# Patient Record
Sex: MALE | Race: WHITE
[De-identification: names, ages, dates, MRNs, and addresses within clinical notes are randomized per-mention and may not be internally consistent; named-entity substitution may affect disease eponyms.]

---

## 2019-05-16 ENCOUNTER — APPOINTMENT (OUTPATIENT)
Dept: OTOLARYNGOLOGY | Facility: CLINIC | Age: 84
End: 2019-05-16
Payer: MEDICARE

## 2019-05-16 PROCEDURE — 92567 TYMPANOMETRY: CPT

## 2019-05-16 PROCEDURE — 92557 COMPREHENSIVE HEARING TEST: CPT

## 2019-05-16 PROCEDURE — 99213 OFFICE O/P EST LOW 20 MIN: CPT | Mod: 25

## 2019-05-16 PROCEDURE — 69210 REMOVE IMPACTED EAR WAX UNI: CPT

## 2019-05-16 RX ORDER — LOSARTAN POTASSIUM 50 MG/1
50 TABLET, FILM COATED ORAL
Qty: 30 | Refills: 0 | Status: ACTIVE | COMMUNITY
Start: 2018-11-21

## 2019-05-16 RX ORDER — SOLIFENACIN SUCCINATE 10 MG/1
10 TABLET, FILM COATED ORAL
Qty: 30 | Refills: 0 | Status: ACTIVE | COMMUNITY
Start: 2018-11-16

## 2019-05-16 RX ORDER — FLUTICASONE PROPIONATE 50 UG/1
50 SPRAY, METERED NASAL
Qty: 16 | Refills: 0 | Status: ACTIVE | COMMUNITY
Start: 2018-11-21

## 2019-05-16 RX ORDER — AMLODIPINE BESYLATE 10 MG/1
10 TABLET ORAL
Qty: 30 | Refills: 0 | Status: ACTIVE | COMMUNITY
Start: 2018-11-21

## 2019-05-16 RX ORDER — TERAZOSIN 10 MG/1
10 CAPSULE ORAL
Qty: 30 | Refills: 0 | Status: ACTIVE | COMMUNITY
Start: 2018-11-21

## 2019-05-16 NOTE — HISTORY OF PRESENT ILLNESS
[de-identified] : This gentleman presents for evaluation of change in hearing in his left ear in the last 3-5 months.\par He notices his hearing is not as acute positioned in his left ear.\par He has a previous history of tinnitus unfortunately that resolved and is not return. He is not having any vestibular symptoms.

## 2019-05-16 NOTE — ASSESSMENT
[FreeTextEntry1] : -Findings were reviewed and discussed with the patient in detail\par -Good aural hygiene reviewed\par -Patient may use wax removal drops as needed\par -The patient will be placed on antibiotic eardrops for one week\par -The patient will be placed on a short burst of prednisone.\par -He  may try a nasal steroid spray or decongestant/antihistamine.\par -Avoid noise exposure\par -Audiogram- He has a combination of age appropriate hearing loss as well as progression of hearing loss in his left ear. I compared this to a audiogram from one year ago. We did talk about the utility of working him up for retrocochlear lesion. He had familiar with acoustic neuroma. He does not of any vestibular symptoms are no onset of tinnitus so we will defer this at this time.\par He will be scheduled for an [facility] for hearing aid amplification consultation. He is medically cleared for amplification.\par -The patient was asked to call and return urgently if any problems\par -I will see the patient in follow up prn.

## 2019-05-16 NOTE — CONSULT LETTER
[Dear  ___] : Dear  [unfilled], [Please see my note below.] : Please see my note below. [Courtesy Letter:] : I had the pleasure of seeing your patient, [unfilled], in my office today. [Sincerely,] : Sincerely, [FreeTextEntry3] : Abdon Stevenson MD [FreeTextEntry1] : Enclosed audiogram.

## 2019-06-14 ENCOUNTER — APPOINTMENT (OUTPATIENT)
Dept: OTOLARYNGOLOGY | Facility: CLINIC | Age: 84
End: 2019-06-14

## 2021-07-20 ENCOUNTER — APPOINTMENT (OUTPATIENT)
Dept: OTOLARYNGOLOGY | Facility: CLINIC | Age: 86
End: 2021-07-20
Payer: MEDICARE

## 2021-07-20 VITALS — HEIGHT: 66 IN | TEMPERATURE: 97.3 F | BODY MASS INDEX: 22.34 KG/M2 | WEIGHT: 139 LBS

## 2021-07-20 PROCEDURE — 99214 OFFICE O/P EST MOD 30 MIN: CPT | Mod: 25

## 2021-07-20 PROCEDURE — 31231 NASAL ENDOSCOPY DX: CPT

## 2021-07-20 PROCEDURE — 92567 TYMPANOMETRY: CPT

## 2021-07-20 PROCEDURE — 69420 INCISION OF EARDRUM: CPT | Mod: LT

## 2021-07-20 PROCEDURE — 92557 COMPREHENSIVE HEARING TEST: CPT

## 2021-07-20 RX ORDER — KETOCONAZOLE 20.5 MG/ML
2 SHAMPOO, SUSPENSION TOPICAL
Qty: 120 | Refills: 0 | Status: DISCONTINUED | COMMUNITY
Start: 2018-09-27 | End: 2021-07-20

## 2021-07-26 NOTE — ASSESSMENT
[FreeTextEntry1] : Dizziness, fluid notable in the left middle ear and mastoid on neurology workup. He had left effusion, now drained by myringotomy. I asked him to observe water precautions and followup in 2 weeks. We will recheck his hearing at that point, to be sure the effusion has resolved and the tympanic membrane has reestablished itself.  It is unclear whether the left serous effusion is the source of his current dizziness,we will follow him to see if he improves after drainage

## 2021-07-26 NOTE — HISTORY OF PRESENT ILLNESS
[de-identified] : Patient previously seen by Dr. Stevenson May 2019 for cerumen impaction. 3 weeks ago reports that he fell in his building after being dizzy. Seen by Dr. Botello, referred to neurology. MRI for intracranial source of vertigo was negative but they did find "inflammation in the left ear."  Questions whether this is the source of vertigo. His left ear feels clogged and at the hearing is somewhat declined.

## 2021-08-03 ENCOUNTER — APPOINTMENT (OUTPATIENT)
Dept: OTOLARYNGOLOGY | Facility: CLINIC | Age: 86
End: 2021-08-03
Payer: MEDICARE

## 2021-08-03 VITALS — BODY MASS INDEX: 22.34 KG/M2 | TEMPERATURE: 96 F | HEIGHT: 66 IN | WEIGHT: 139 LBS

## 2021-08-03 PROCEDURE — 92557 COMPREHENSIVE HEARING TEST: CPT

## 2021-08-03 PROCEDURE — 92504 EAR MICROSCOPY EXAMINATION: CPT

## 2021-08-03 PROCEDURE — 99213 OFFICE O/P EST LOW 20 MIN: CPT | Mod: 25

## 2021-08-03 PROCEDURE — 92567 TYMPANOMETRY: CPT

## 2021-08-03 NOTE — HISTORY OF PRESENT ILLNESS
[de-identified] : Patient previously seen by Dr. Stevenson May 2019 for cerumen impaction.\par In early July reports that he fell in his building after being dizzy. Seen by Dr. Botello, referred to neurology. MRI for intracranial source of vertigo was negative but they did find "inflammation in the left ear."  MRI report showed serous effusion left middle ear and mastoid, less fluid right side.  Questions whether this is the source of vertigo. His left ear feels clogged and at the hearing is somewhat declined.  \par Audio showed conductive Overlay on sensorineural hearing loss. Myringotomy released a tremendous amount of fluid and the patient had instantaneous improvement. Still feels as if the ear is better, but baseline Imbalance and lightheadedness No spinning sensation, No positional symptoms

## 2021-08-03 NOTE — ASSESSMENT
[FreeTextEntry1] : Imbalance, persistent after resolution of effusion.  This is likely not the source of his symptoms. Patient also discontinued a glaucoma eyedrop that had side effect profile of dizziness, and likewise did not improve.I recommended VNG in further workup and possibly vestibular therapy thereafter.  we discussed dizziness and imbalance has a extremely long differential diagnosis list\par \par Persistent TM perf, followup 1-2 months. Recommended dry ear precautions in the meantime on the left side

## 2021-08-10 ENCOUNTER — APPOINTMENT (OUTPATIENT)
Dept: OTOLARYNGOLOGY | Facility: CLINIC | Age: 86
End: 2021-08-10
Payer: MEDICARE

## 2021-08-10 VITALS
HEIGHT: 66 IN | HEART RATE: 60 BPM | OXYGEN SATURATION: 98 % | DIASTOLIC BLOOD PRESSURE: 69 MMHG | BODY MASS INDEX: 22.34 KG/M2 | TEMPERATURE: 98.4 F | SYSTOLIC BLOOD PRESSURE: 169 MMHG | WEIGHT: 139 LBS

## 2021-08-10 VITALS — SYSTOLIC BLOOD PRESSURE: 176 MMHG | DIASTOLIC BLOOD PRESSURE: 73 MMHG

## 2021-08-10 DIAGNOSIS — Z78.9 OTHER SPECIFIED HEALTH STATUS: ICD-10-CM

## 2021-08-10 DIAGNOSIS — R42 DIZZINESS AND GIDDINESS: ICD-10-CM

## 2021-08-10 DIAGNOSIS — Z72.89 OTHER PROBLEMS RELATED TO LIFESTYLE: ICD-10-CM

## 2021-08-10 DIAGNOSIS — H40.059 OCULAR HYPERTENSION, UNSPECIFIED EYE: ICD-10-CM

## 2021-08-10 DIAGNOSIS — Z80.9 FAMILY HISTORY OF MALIGNANT NEOPLASM, UNSPECIFIED: ICD-10-CM

## 2021-08-10 PROCEDURE — 99213 OFFICE O/P EST LOW 20 MIN: CPT

## 2021-08-10 NOTE — HISTORY OF PRESENT ILLNESS
[de-identified] : 86 yo man with progressive disequilbrium for months-yrs. No incitng event. He has seen numerous physicians incl neurologists and cause has not been found. He saw liz Rhodes praveena was identified and drained and his hearing improved but he continued to have disequilibrium. Here to see if a cause can be found. He has seen his inernist and had his heart checked and all has been found to be stable.

## 2021-08-10 NOTE — DATA REVIEWED
[de-identified] : b symm downsloping snhl - reviewed withpt [de-identified] : mri mv isch change; mra ok - reviewed with pt

## 2021-08-10 NOTE — DATA REVIEWED
[de-identified] : b symm downsloping snhl - reviewed withpt [de-identified] : mri mv isch change; mra ok - reviewed with pt

## 2021-08-10 NOTE — PHYSICAL EXAM
[Normal] : assessment of respiratory effort is normal [] : [place]-Hallpike test is negative [de-identified] : gait steady

## 2021-08-10 NOTE — ASSESSMENT
[FreeTextEntry1] : dizziness\par here to check to see whether a vestibular problem can be identified\par vng ordered \par rtc with vng

## 2021-08-10 NOTE — PHYSICAL EXAM
[Normal] : assessment of respiratory effort is normal [] : [place]-Hallpike test is negative [de-identified] : gait steady Epidermal Closure: simple interrupted

## 2021-08-10 NOTE — HISTORY OF PRESENT ILLNESS
[de-identified] : 84 yo man with progressive disequilbrium for months-yrs. No incitng event. He has seen numerous physicians incl neurologists and cause has not been found. He saw liz Rhodes praveena was identified and drained and his hearing improved but he continued to have disequilibrium. Here to see if a cause can be found. He has seen his inernist and had his heart checked and all has been found to be stable.

## 2021-08-30 ENCOUNTER — APPOINTMENT (OUTPATIENT)
Dept: OTOLARYNGOLOGY | Facility: CLINIC | Age: 86
End: 2021-08-30
Payer: MEDICARE

## 2021-08-30 VITALS
TEMPERATURE: 98.3 F | OXYGEN SATURATION: 97 % | DIASTOLIC BLOOD PRESSURE: 43 MMHG | HEART RATE: 74 BPM | SYSTOLIC BLOOD PRESSURE: 160 MMHG

## 2021-08-30 PROCEDURE — 99213 OFFICE O/P EST LOW 20 MIN: CPT

## 2021-08-30 PROCEDURE — 92540 BASIC VESTIBULAR EVALUATION: CPT

## 2021-08-30 PROCEDURE — 92537 CALORIC VSTBLR TEST W/REC: CPT

## 2021-08-30 NOTE — HISTORY OF PRESENT ILLNESS
[de-identified] : followup 85 yo man with dizziness- he had vng to see if there was an otologic etiology. He feels the same- it is disequilibrium.

## 2021-08-30 NOTE — ASSESSMENT
[FreeTextEntry1] : dizziness likely related to aging \par he said he has already had complete physical exam\par v rehab ordered\par rtc 2 mo to monitor progress

## 2021-09-17 ENCOUNTER — APPOINTMENT (OUTPATIENT)
Dept: OTOLARYNGOLOGY | Facility: CLINIC | Age: 86
End: 2021-09-17
Payer: MEDICARE

## 2021-09-17 VITALS — BODY MASS INDEX: 22.34 KG/M2 | WEIGHT: 139 LBS | HEIGHT: 66 IN | TEMPERATURE: 97.7 F

## 2021-09-17 DIAGNOSIS — R42 DIZZINESS AND GIDDINESS: ICD-10-CM

## 2021-09-17 DIAGNOSIS — H61.23 IMPACTED CERUMEN, BILATERAL: ICD-10-CM

## 2021-09-17 DIAGNOSIS — H90.5 UNSPECIFIED SENSORINEURAL HEARING LOSS: ICD-10-CM

## 2021-09-17 PROCEDURE — 69210 REMOVE IMPACTED EAR WAX UNI: CPT

## 2021-09-17 PROCEDURE — 99213 OFFICE O/P EST LOW 20 MIN: CPT | Mod: 25

## 2021-09-17 NOTE — ASSESSMENT
[FreeTextEntry1] : Imbalance, persistent after resolution of effusion.  VNG Did not clarify the picture as it showed possible central versus peripheral involvement.I recommended home exercises and if he fails, he will go to a vestibular therapy site, this is his preference. He will also be going to [place] soon followup 6 months.  Recheck audio at next visit, reassess perf\par \par Cerumen impaction, now resolved.  The patient was counseled in aural hygiene and Qtip avoidance. \par

## 2021-09-17 NOTE — HISTORY OF PRESENT ILLNESS
[de-identified] : Patient previously seen by Dr. Stevenson May 2019 for cerumen impaction.\par In early July reports that he fell in his building after being dizzy. Seen by Dr. Botello, referred to neurology. MRI for intracranial source of vertigo was negative but they did find "inflammation in the left ear."  MRI report showed serous effusion left middle ear and mastoid, less fluid right side.  Questions whether this is the source of vertigo. His left ear feels clogged and at the hearing is somewhat declined.  \par Audio showed conductive Overlay on sensorineural hearing loss. Myringotomy released a tremendous amount of fluid and the patient had instantaneous improvement. Still feels as if the ear is better, but baseline Imbalance and lightheadedness No spinning sensation, No positional symptoms\par \par Referred for the NG which showed vertical nystagmus, cannot rule out central involvement.  Conversely showed low-level nystagmus cannot rule out peripheral pathology.  Patient feels as if his balance is still compromised. He did not proceed with vestibular therapy as offered

## 2021-10-25 ENCOUNTER — APPOINTMENT (OUTPATIENT)
Dept: OTOLARYNGOLOGY | Facility: CLINIC | Age: 86
End: 2021-10-25

## 2023-07-31 ENCOUNTER — APPOINTMENT (OUTPATIENT)
Dept: OTOLARYNGOLOGY | Facility: CLINIC | Age: 88
End: 2023-07-31

## 2023-08-09 ENCOUNTER — APPOINTMENT (OUTPATIENT)
Dept: OTOLARYNGOLOGY | Facility: CLINIC | Age: 88
End: 2023-08-09

## 2023-08-15 ENCOUNTER — APPOINTMENT (OUTPATIENT)
Dept: OTOLARYNGOLOGY | Facility: CLINIC | Age: 88
End: 2023-08-15
Payer: MEDICARE

## 2023-08-15 DIAGNOSIS — H69.80 OTHER SPECIFIED DISORDERS OF EUSTACHIAN TUBE, UNSPECIFIED EAR: ICD-10-CM

## 2023-08-15 DIAGNOSIS — H90.A32 MIXED CONDUCTIVE AND SENSORINEURAL HEARING, UNILATERAL, LEFT EAR WITH RESTRICTED HEARING ON THE  CONTRALATERAL SIDE: ICD-10-CM

## 2023-08-15 DIAGNOSIS — H90.6 MIXED CONDUCTIVE AND SENSORINEURAL HEARING LOSS, BILATERAL: ICD-10-CM

## 2023-08-15 DIAGNOSIS — H90.3 SENSORINEURAL HEARING LOSS, BILATERAL: ICD-10-CM

## 2023-08-15 DIAGNOSIS — H65.22 CHRONIC SEROUS OTITIS MEDIA, LEFT EAR: ICD-10-CM

## 2023-08-15 PROCEDURE — 99213 OFFICE O/P EST LOW 20 MIN: CPT | Mod: 25

## 2023-08-15 PROCEDURE — 92557 COMPREHENSIVE HEARING TEST: CPT

## 2023-08-15 PROCEDURE — 92567 TYMPANOMETRY: CPT

## 2023-08-15 PROCEDURE — G0268 REMOVAL OF IMPACTED WAX MD: CPT

## 2023-08-15 NOTE — CONSULT LETTER
[FreeTextEntry2] : MATEO DILLARD [FreeTextEntry1] :   Dear  Dr. MATEO DILLARD,  I had the pleasure of seeing your patient today.   Please see my note below.   Thank you very much for allowing me to participate in the care of your patient.  Sincerely,  Jose Roberto Diallo MD [facility]  [facility]

## 2023-08-15 NOTE — HISTORY OF PRESENT ILLNESS
[de-identified] : TRAN MERCADO was seen on August 15.  He had seen Dr. Torres here last about 2 years ago.  He comes in with an audiogram showing a new additional left-sided hearing loss on top of his previous symmetric losses.  The audiogram is from about a month ago and he believes that is about the time the symptoms started.  He denies otalgia or otorrhea.  The patient had no other ear nose or throat complaints at this visit.

## 2023-08-15 NOTE — PHYSICAL EXAM
[FreeTextEntry1] : General: The patient was alert and oriented and in no distress. Voice was clear.  He is using a cane for ambulation  Ears:  Procedure note:  Removal of Cerumen Impactions, both ears:  [id number] Both external ears were normal. There were symptomatic cerumen impactions in both ears.  These were cleared microscopically without trauma using both suction and curettes.  After clearing,  both ear canals were clear both eardrums were intact and thickened l>r [place] Zhang evaluation was equivocal but bone conduction was better than air conduction in the left ear   Oral cavity: The oral mucosa was normal. The oral and base of tongue were clear and without mass. The gingival and buccal mucosa were moist and without lesions. The palate moved well. There was no cleft to the palate. There appeared to be good salivary flow.   There was no pus, erythema or mass in the oral cavity.   Neck:  The neck was symmetrical. The parotid and submandibular glands were normal without masses. The trachea was midline and there was no unusual crepitus. The thyroid was smooth and nontender and no masses were palpated. There was no significant cervical adenopathy.  Face: The patient had no facial asymmetry or mass. The skin was unremarkable.  [de-identified] : A complete audiogram was ordered, done and reviewed with the patient.  This was compared to his most recent hearing test a month ago and to 2 years ago.  This shows a new left-sided conductive hearing loss in addition to his sensorineural hearing losses  I reviewed the findings with Mr. Colvin and his wife.  We discussed the option of myringotomy.  I could not say for sure that this would work as the effusion may have been there for quite some time.  As they were going to get hearing aids anyway, he decided to hold off.  I suggested trying to insufflate and suggested repeat evaluation in a year or as needed

## 2023-08-18 PROBLEM — H90.6 MIXED CONDUCTIVE AND SENSORINEURAL HEARING LOSS OF BOTH EARS: Status: ACTIVE | Noted: 2023-08-18
